# Patient Record
(demographics unavailable — no encounter records)

---

## 2025-01-12 NOTE — IMAGING
[de-identified] : The patient is a well appearing 18 year  old male of their stated age. Neck is supple & nontender to palpation. Negative Spurling's test.   Effected Shoulder: RIGHT  Inspection: Scapula Winging: Negative Deformity: None Erythema: None Ecchymosis: None Abrasions: None Effusion: None   Range of Motion: Active Forward Flexion: 180 degrees Active Abduction: 180 degrees Passive Forward Flexion: 180 degrees Passive Abduction: 180 degrees ER @ 90 degrees: 90 degrees IR @ 90 degrees: 45 degrees ER @ 0 degrees: 50 degrees Motor Exam: Forward Flexion: 5 out of 5 Flexion Plane of Scapula: 5 out of 5 Abduction: 5 out of 5 Internal Rotation: 5 out of 5 External Rotation: 5 out of 5 Distal Motor Strength: 5 out of 5   Stability Testing: Anterior: 3+ Posterior: 2+ Sulcus N: 1+ Sulcus ER: 1+ Provocative Tests: Drop Arm: Negative Impingement: Negative [place]: POSITIVE  X-Arm Adduction: Negative Belly Press: Negative Bear Hug: Negative Lift Off: Negative Apprehension: POSITIVE  Relocation: POSITIVE  Posterior Load & Shift: Negative   Palpation: AC Joint: Nontender Clavicle: Nontender SC Joint: Nontender Bicipital Groove: Nontender Coracoid Process: Nontender Pectoralis Minor Tendon: Nontender Pectoralis Major Tendon: Nontender & palpably intact Latissimus Dorsi: Nontender Proximal Humerus: Nontender Scapula Body: Nontender Medial Scapula Boarder: Nontender Scapula Spine: Nontender   Neurologic Exam: Sensation to Light Touch: Axillary: Grossly intact Ulnar: Grossly intact Radial: Grossly intact Median: Grossly intact Other:  N/A Circulatory/Pulses: Ulnar: 2+ Radial: 2+ Other Pertinent Findings: None   >>>>>>>>>>>>>>>>>>>>>>>>>>>>>>>>>>>>>>>>>>>>>>>>>>>>>>>   Effected Shoulder: LEFT  Inspection: Scapula Winging: Negative Deformity: None Erythema: None Ecchymosis: None Abrasions: None Effusion: None   Range of Motion: Active Forward Flexion: 180 degrees Active Abduction: 180 degrees Passive Forward Flexion: 180 degrees Passive Abduction: 180 degrees ER @ 90 degrees: 90 degrees IR @ 90 degrees: 45 degrees ER @ 0 degrees: 50 degrees Motor Exam: Forward Flexion: 5 out of 5 Flexion Plane of Scapula: 5 out of 5 Abduction: 5 out of 5 Internal Rotation: 5 out of 5 External Rotation: 5 out of 5 Distal Motor Strength: 5 out of 5   Stability Testing: Anterior: 3+ Posterior: 3+ Sulcus N: 1+ Sulcus ER: 1+ Provocative Tests: Drop Arm: Negative Impingement: Negative [place]: POSITIVE  X-Arm Adduction: Negative Belly Press: Negative Bear Hug: Negative Lift Off: Negative Apprehension: POSITIVE  Relocation: POSITIVE  Posterior Load & Shift: Negative   Palpation: AC Joint: Nontender Clavicle: Nontender SC Joint: Nontender Bicipital Groove: Nontender Coracoid Process: Nontender Pectoralis Minor Tendon: Nontender Pectoralis Major Tendon: Nontender & palpably intact Latissimus Dorsi: Nontender Proximal Humerus: Nontender Scapula Body: Nontender Medial Scapula Boarder: Nontender Scapula Spine: Nontender   Neurologic Exam: Sensation to Light Touch: Axillary: Grossly intact Ulnar: Grossly intact Radial: Grossly intact Median: Grossly intact Other:  N/A Circulatory/Pulses: Ulnar: 2+ Radial: 2+ Other Pertinent Findings: None   Assessment: The patient is a 18-year-old male with bilateral shoulder pain (R>L) and radiographic and physical exam findings consistent with instability and probable labral tear The patients condition is acute Documents/Results Reviewed Today: X-Ray bilateral shoulder/scapula  Tests/Studies Independently Interpreted Today: X-Ray right shoulder/scapula reveals evidence of Hill-Sachs deformity and chronic acromioclavicular joint calcification. X-Ray left shoulder/scapula reveals evidence of Hill-Sachs deformity.  Pertinent findings include: RIGHT SHOULDER: 180/180/90/45/50, grossly intact rotator cuff strength, +[place]'s, +apprehension, +relocation, 3+ anterior, 2+ posterior. LEFT SHOULDER: 180/180/90/45/50, grossly intact rotator cuff strength, +[place]'s, +apprehension, +relocation, 3+ anterior stability, 2+ posterior.  Confounding medical conditions/concerns: None   Plan: We expressed concern for the patient's bilateral shoulder clinical instability and report of shoulder dislocations, especially given his activity status as a . Given he is mid-season, the patient decides to defer any arthroscopic surgery to repair his unstable shoulder therefore we will defer obtaining radiographic imaging. Patient will start physical therapy, HEP, and stretching. Advised patient to obtain a posture shirt. He will focus on strengthening throughout the season. If one of his shoulders becomes grossly unstable, he will shut down and follow up to obtain an MR-A and obtaining a Vonda for that side. Discussed taking OTC anti-inflammatories as needed - use as directed. Modify activity as discussed.   Tests Ordered: None  Prescription Medications Ordered: Discussed use of OTC NSAIDs including but not limited to Aleve, Motrin, Tylenol Advil, etc. Braces/DME Ordered: Posture shirt Activity/Work/Sports Status: None Additional Instructions: As tolerated  Follow-Up: after season ends  The patient's current medication management of their orthopedic diagnosis was reviewed today: The patient declined and/or was contraindicated for the recommended prescription medication Naprosyn and will use over the counter Advil, Aleve, Voltaren Gel or Tylenol as directed. (1) We discussed a comprehensive treatment plan that included possible pharmaceutical management involving the use of prescription strength medications including but not limited to options such as oral Naprosyn 500mg BID, once daily Meloxicam 15 mg, or 500-650 mg Tylenol versus over the counter oral medications and topical prescription NSAID Pennsaid vs over the counter Voltaren gel.  Based on our extensive discussion, the patient declined prescription medication and will use over the counter Advil, Alleve, Voltaren Gel or Tylenol as directed. (2) There is a moderate risk of morbidity with further treatment, especially from use of prescription strength medications and possible side effects of these medications which include upset stomach with oral medications, skin reactions to topical medications and cardiac/renal issues with long term use. (3) I recommended that the patient follow-up with their medical physician to discuss any significant specific potential issues with long term medication use such as interactions with current medications or with exacerbation of underlying medical comorbidities. (4) The benefits and risks associated with use of injectable, oral or topical, prescription and over the counter anti-inflammatory medications were discussed with the patient. The patient voiced understanding of the risks including but not limited to bleeding, stroke, kidney dysfunction, heart disease, and were referred to the black box warning label for further information.   I, Nataliya Otero attest that this documentation has been prepared under the direction and in the presence of Provider Dr. Maximino Polo.   The documentation recorded by the scribe accurately reflects the services I, Dr. Maximino Polo, personally performed and the decisions made by me.

## 2025-01-12 NOTE — IMAGING
[de-identified] : The patient is a well appearing 18 year  old male of their stated age. Neck is supple & nontender to palpation. Negative Spurling's test.   Effected Shoulder: RIGHT  Inspection: Scapula Winging: Negative Deformity: None Erythema: None Ecchymosis: None Abrasions: None Effusion: None   Range of Motion: Active Forward Flexion: 180 degrees Active Abduction: 180 degrees Passive Forward Flexion: 180 degrees Passive Abduction: 180 degrees ER @ 90 degrees: 90 degrees IR @ 90 degrees: 45 degrees ER @ 0 degrees: 50 degrees Motor Exam: Forward Flexion: 5 out of 5 Flexion Plane of Scapula: 5 out of 5 Abduction: 5 out of 5 Internal Rotation: 5 out of 5 External Rotation: 5 out of 5 Distal Motor Strength: 5 out of 5   Stability Testing: Anterior: 3+ Posterior: 2+ Sulcus N: 1+ Sulcus ER: 1+ Provocative Tests: Drop Arm: Negative Impingement: Negative [place]: POSITIVE  X-Arm Adduction: Negative Belly Press: Negative Bear Hug: Negative Lift Off: Negative Apprehension: POSITIVE  Relocation: POSITIVE  Posterior Load & Shift: Negative   Palpation: AC Joint: Nontender Clavicle: Nontender SC Joint: Nontender Bicipital Groove: Nontender Coracoid Process: Nontender Pectoralis Minor Tendon: Nontender Pectoralis Major Tendon: Nontender & palpably intact Latissimus Dorsi: Nontender Proximal Humerus: Nontender Scapula Body: Nontender Medial Scapula Boarder: Nontender Scapula Spine: Nontender   Neurologic Exam: Sensation to Light Touch: Axillary: Grossly intact Ulnar: Grossly intact Radial: Grossly intact Median: Grossly intact Other:  N/A Circulatory/Pulses: Ulnar: 2+ Radial: 2+ Other Pertinent Findings: None   >>>>>>>>>>>>>>>>>>>>>>>>>>>>>>>>>>>>>>>>>>>>>>>>>>>>>>>   Effected Shoulder: LEFT  Inspection: Scapula Winging: Negative Deformity: None Erythema: None Ecchymosis: None Abrasions: None Effusion: None   Range of Motion: Active Forward Flexion: 180 degrees Active Abduction: 180 degrees Passive Forward Flexion: 180 degrees Passive Abduction: 180 degrees ER @ 90 degrees: 90 degrees IR @ 90 degrees: 45 degrees ER @ 0 degrees: 50 degrees Motor Exam: Forward Flexion: 5 out of 5 Flexion Plane of Scapula: 5 out of 5 Abduction: 5 out of 5 Internal Rotation: 5 out of 5 External Rotation: 5 out of 5 Distal Motor Strength: 5 out of 5   Stability Testing: Anterior: 3+ Posterior: 3+ Sulcus N: 1+ Sulcus ER: 1+ Provocative Tests: Drop Arm: Negative Impingement: Negative [place]: POSITIVE  X-Arm Adduction: Negative Belly Press: Negative Bear Hug: Negative Lift Off: Negative Apprehension: POSITIVE  Relocation: POSITIVE  Posterior Load & Shift: Negative   Palpation: AC Joint: Nontender Clavicle: Nontender SC Joint: Nontender Bicipital Groove: Nontender Coracoid Process: Nontender Pectoralis Minor Tendon: Nontender Pectoralis Major Tendon: Nontender & palpably intact Latissimus Dorsi: Nontender Proximal Humerus: Nontender Scapula Body: Nontender Medial Scapula Boarder: Nontender Scapula Spine: Nontender   Neurologic Exam: Sensation to Light Touch: Axillary: Grossly intact Ulnar: Grossly intact Radial: Grossly intact Median: Grossly intact Other:  N/A Circulatory/Pulses: Ulnar: 2+ Radial: 2+ Other Pertinent Findings: None   Assessment: The patient is a 18-year-old male with bilateral shoulder pain (R>L) and radiographic and physical exam findings consistent with instability and probable labral tear The patients condition is acute Documents/Results Reviewed Today: X-Ray bilateral shoulder/scapula  Tests/Studies Independently Interpreted Today: X-Ray right shoulder/scapula reveals evidence of Hill-Sachs deformity and chronic acromioclavicular joint calcification. X-Ray left shoulder/scapula reveals evidence of Hill-Sachs deformity.  Pertinent findings include: RIGHT SHOULDER: 180/180/90/45/50, grossly intact rotator cuff strength, +[place]'s, +apprehension, +relocation, 3+ anterior, 2+ posterior. LEFT SHOULDER: 180/180/90/45/50, grossly intact rotator cuff strength, +[place]'s, +apprehension, +relocation, 3+ anterior stability, 2+ posterior.  Confounding medical conditions/concerns: None   Plan: We expressed concern for the patient's bilateral shoulder clinical instability and report of shoulder dislocations, especially given his activity status as a . Given he is mid-season, the patient decides to defer any arthroscopic surgery to repair his unstable shoulder therefore we will defer obtaining radiographic imaging. Patient will start physical therapy, HEP, and stretching. Advised patient to obtain a posture shirt. He will focus on strengthening throughout the season. If one of his shoulders becomes grossly unstable, he will shut down and follow up to obtain an MR-A and obtaining a Vonda for that side. Discussed taking OTC anti-inflammatories as needed - use as directed. Modify activity as discussed.   Tests Ordered: None  Prescription Medications Ordered: Discussed use of OTC NSAIDs including but not limited to Aleve, Motrin, Tylenol Advil, etc. Braces/DME Ordered: Posture shirt Activity/Work/Sports Status: None Additional Instructions: As tolerated  Follow-Up: after season ends  The patient's current medication management of their orthopedic diagnosis was reviewed today: The patient declined and/or was contraindicated for the recommended prescription medication Naprosyn and will use over the counter Advil, Aleve, Voltaren Gel or Tylenol as directed. (1) We discussed a comprehensive treatment plan that included possible pharmaceutical management involving the use of prescription strength medications including but not limited to options such as oral Naprosyn 500mg BID, once daily Meloxicam 15 mg, or 500-650 mg Tylenol versus over the counter oral medications and topical prescription NSAID Pennsaid vs over the counter Voltaren gel.  Based on our extensive discussion, the patient declined prescription medication and will use over the counter Advil, Alleve, Voltaren Gel or Tylenol as directed. (2) There is a moderate risk of morbidity with further treatment, especially from use of prescription strength medications and possible side effects of these medications which include upset stomach with oral medications, skin reactions to topical medications and cardiac/renal issues with long term use. (3) I recommended that the patient follow-up with their medical physician to discuss any significant specific potential issues with long term medication use such as interactions with current medications or with exacerbation of underlying medical comorbidities. (4) The benefits and risks associated with use of injectable, oral or topical, prescription and over the counter anti-inflammatory medications were discussed with the patient. The patient voiced understanding of the risks including but not limited to bleeding, stroke, kidney dysfunction, heart disease, and were referred to the black box warning label for further information.   I, Nataliya Otero attest that this documentation has been prepared under the direction and in the presence of Provider Dr. Maximino Polo.   The documentation recorded by the scribe accurately reflects the services I, Dr. Maximino Polo, personally performed and the decisions made by me.

## 2025-01-12 NOTE — HISTORY OF PRESENT ILLNESS
[de-identified] : The patient is a 18 year  old right hand (writing) left hand (hockey) dominant male who presents today complaining of bilateral shoulder pain R>L Date of Injury/Onset: 1/4/25, 1/8/25 Pain:    At Rest: 6/10  With Activity:  0/10  Mechanism of injury: L shoulder dislocated 1/4/25 by landing on the ice. Then B shoulders subluxated 1/8/25 while bench pressing - bar went over his head. Self reduced.  This is NOT a Work Related Injury being treated under Worker's Compensation. This IS an athletic injury occurring associated with an interscholastic or organized sports team. Quality of symptoms: anterior shoulder pain, weakness, clicking  Improves with: rest, ice, NSAIDs Worse with: sleeping  Prior treatment: none Prior Imaging: none Out of work/sport: currently playing sports  School/Sport/Position/Occupation: [employer] Additional Information: R shoulder dislocation 2023

## 2025-01-12 NOTE — IMAGING
[de-identified] : The patient is a well appearing 18 year  old male of their stated age. Neck is supple & nontender to palpation. Negative Spurling's test.   Effected Shoulder: RIGHT  Inspection: Scapula Winging: Negative Deformity: None Erythema: None Ecchymosis: None Abrasions: None Effusion: None   Range of Motion: Active Forward Flexion: 180 degrees Active Abduction: 180 degrees Passive Forward Flexion: 180 degrees Passive Abduction: 180 degrees ER @ 90 degrees: 90 degrees IR @ 90 degrees: 45 degrees ER @ 0 degrees: 50 degrees Motor Exam: Forward Flexion: 5 out of 5 Flexion Plane of Scapula: 5 out of 5 Abduction: 5 out of 5 Internal Rotation: 5 out of 5 External Rotation: 5 out of 5 Distal Motor Strength: 5 out of 5   Stability Testing: Anterior: 3+ Posterior: 2+ Sulcus N: 1+ Sulcus ER: 1+ Provocative Tests: Drop Arm: Negative Impingement: Negative [place]: POSITIVE  X-Arm Adduction: Negative Belly Press: Negative Bear Hug: Negative Lift Off: Negative Apprehension: POSITIVE  Relocation: POSITIVE  Posterior Load & Shift: Negative   Palpation: AC Joint: Nontender Clavicle: Nontender SC Joint: Nontender Bicipital Groove: Nontender Coracoid Process: Nontender Pectoralis Minor Tendon: Nontender Pectoralis Major Tendon: Nontender & palpably intact Latissimus Dorsi: Nontender Proximal Humerus: Nontender Scapula Body: Nontender Medial Scapula Boarder: Nontender Scapula Spine: Nontender   Neurologic Exam: Sensation to Light Touch: Axillary: Grossly intact Ulnar: Grossly intact Radial: Grossly intact Median: Grossly intact Other:  N/A Circulatory/Pulses: Ulnar: 2+ Radial: 2+ Other Pertinent Findings: None   >>>>>>>>>>>>>>>>>>>>>>>>>>>>>>>>>>>>>>>>>>>>>>>>>>>>>>>   Effected Shoulder: LEFT  Inspection: Scapula Winging: Negative Deformity: None Erythema: None Ecchymosis: None Abrasions: None Effusion: None   Range of Motion: Active Forward Flexion: 180 degrees Active Abduction: 180 degrees Passive Forward Flexion: 180 degrees Passive Abduction: 180 degrees ER @ 90 degrees: 90 degrees IR @ 90 degrees: 45 degrees ER @ 0 degrees: 50 degrees Motor Exam: Forward Flexion: 5 out of 5 Flexion Plane of Scapula: 5 out of 5 Abduction: 5 out of 5 Internal Rotation: 5 out of 5 External Rotation: 5 out of 5 Distal Motor Strength: 5 out of 5   Stability Testing: Anterior: 3+ Posterior: 3+ Sulcus N: 1+ Sulcus ER: 1+ Provocative Tests: Drop Arm: Negative Impingement: Negative [place]: POSITIVE  X-Arm Adduction: Negative Belly Press: Negative Bear Hug: Negative Lift Off: Negative Apprehension: POSITIVE  Relocation: POSITIVE  Posterior Load & Shift: Negative   Palpation: AC Joint: Nontender Clavicle: Nontender SC Joint: Nontender Bicipital Groove: Nontender Coracoid Process: Nontender Pectoralis Minor Tendon: Nontender Pectoralis Major Tendon: Nontender & palpably intact Latissimus Dorsi: Nontender Proximal Humerus: Nontender Scapula Body: Nontender Medial Scapula Boarder: Nontender Scapula Spine: Nontender   Neurologic Exam: Sensation to Light Touch: Axillary: Grossly intact Ulnar: Grossly intact Radial: Grossly intact Median: Grossly intact Other:  N/A Circulatory/Pulses: Ulnar: 2+ Radial: 2+ Other Pertinent Findings: None   Assessment: The patient is a 18-year-old male with bilateral shoulder pain (R>L) and radiographic and physical exam findings consistent with instability and probable labral tear The patients condition is acute Documents/Results Reviewed Today: X-Ray bilateral shoulder/scapula  Tests/Studies Independently Interpreted Today: X-Ray right shoulder/scapula reveals evidence of Hill-Sachs deformity and chronic acromioclavicular joint calcification. X-Ray left shoulder/scapula reveals evidence of Hill-Sachs deformity.  Pertinent findings include: RIGHT SHOULDER: 180/180/90/45/50, grossly intact rotator cuff strength, +[place]'s, +apprehension, +relocation, 3+ anterior, 2+ posterior. LEFT SHOULDER: 180/180/90/45/50, grossly intact rotator cuff strength, +[place]'s, +apprehension, +relocation, 3+ anterior stability, 2+ posterior.  Confounding medical conditions/concerns: None   Plan: We expressed concern for the patient's bilateral shoulder clinical instability and report of shoulder dislocations, especially given his activity status as a . Given he is mid-season, the patient decides to defer any arthroscopic surgery to repair his unstable shoulder therefore we will defer obtaining radiographic imaging. Patient will start physical therapy, HEP, and stretching. Advised patient to obtain a posture shirt. He will focus on strengthening throughout the season. If one of his shoulders becomes grossly unstable, he will shut down and follow up to obtain an MR-A and obtaining a Vonda for that side. Discussed taking OTC anti-inflammatories as needed - use as directed. Modify activity as discussed.   Tests Ordered: None  Prescription Medications Ordered: Discussed use of OTC NSAIDs including but not limited to Aleve, Motrin, Tylenol Advil, etc. Braces/DME Ordered: Posture shirt Activity/Work/Sports Status: None Additional Instructions: As tolerated  Follow-Up: after season ends  The patient's current medication management of their orthopedic diagnosis was reviewed today: The patient declined and/or was contraindicated for the recommended prescription medication Naprosyn and will use over the counter Advil, Aleve, Voltaren Gel or Tylenol as directed. (1) We discussed a comprehensive treatment plan that included possible pharmaceutical management involving the use of prescription strength medications including but not limited to options such as oral Naprosyn 500mg BID, once daily Meloxicam 15 mg, or 500-650 mg Tylenol versus over the counter oral medications and topical prescription NSAID Pennsaid vs over the counter Voltaren gel.  Based on our extensive discussion, the patient declined prescription medication and will use over the counter Advil, Alleve, Voltaren Gel or Tylenol as directed. (2) There is a moderate risk of morbidity with further treatment, especially from use of prescription strength medications and possible side effects of these medications which include upset stomach with oral medications, skin reactions to topical medications and cardiac/renal issues with long term use. (3) I recommended that the patient follow-up with their medical physician to discuss any significant specific potential issues with long term medication use such as interactions with current medications or with exacerbation of underlying medical comorbidities. (4) The benefits and risks associated with use of injectable, oral or topical, prescription and over the counter anti-inflammatory medications were discussed with the patient. The patient voiced understanding of the risks including but not limited to bleeding, stroke, kidney dysfunction, heart disease, and were referred to the black box warning label for further information.   I, Nataliya Otero attest that this documentation has been prepared under the direction and in the presence of Provider Dr. Maximino Polo.   The documentation recorded by the scribe accurately reflects the services I, Dr. Maximino Polo, personally performed and the decisions made by me.

## 2025-01-12 NOTE — HISTORY OF PRESENT ILLNESS
[de-identified] : The patient is a 18 year  old right hand (writing) left hand (hockey) dominant male who presents today complaining of bilateral shoulder pain R>L Date of Injury/Onset: 1/4/25, 1/8/25 Pain:    At Rest: 6/10  With Activity:  0/10  Mechanism of injury: L shoulder dislocated 1/4/25 by landing on the ice. Then B shoulders subluxated 1/8/25 while bench pressing - bar went over his head. Self reduced.  This is NOT a Work Related Injury being treated under Worker's Compensation. This IS an athletic injury occurring associated with an interscholastic or organized sports team. Quality of symptoms: anterior shoulder pain, weakness, clicking  Improves with: rest, ice, NSAIDs Worse with: sleeping  Prior treatment: none Prior Imaging: none Out of work/sport: currently playing sports  School/Sport/Position/Occupation: [employer] Additional Information: R shoulder dislocation 2023

## 2025-01-12 NOTE — HISTORY OF PRESENT ILLNESS
[de-identified] : The patient is a 18 year  old right hand (writing) left hand (hockey) dominant male who presents today complaining of bilateral shoulder pain R>L Date of Injury/Onset: 1/4/25, 1/8/25 Pain:    At Rest: 6/10  With Activity:  0/10  Mechanism of injury: L shoulder dislocated 1/4/25 by landing on the ice. Then B shoulders subluxated 1/8/25 while bench pressing - bar went over his head. Self reduced.  This is NOT a Work Related Injury being treated under Worker's Compensation. This IS an athletic injury occurring associated with an interscholastic or organized sports team. Quality of symptoms: anterior shoulder pain, weakness, clicking  Improves with: rest, ice, NSAIDs Worse with: sleeping  Prior treatment: none Prior Imaging: none Out of work/sport: currently playing sports  School/Sport/Position/Occupation: [employer] Additional Information: R shoulder dislocation 2023